# Patient Record
Sex: FEMALE | Race: WHITE | NOT HISPANIC OR LATINO
[De-identification: names, ages, dates, MRNs, and addresses within clinical notes are randomized per-mention and may not be internally consistent; named-entity substitution may affect disease eponyms.]

---

## 2022-01-03 PROBLEM — Z00.00 ENCOUNTER FOR PREVENTIVE HEALTH EXAMINATION: Status: ACTIVE | Noted: 2022-01-03

## 2022-01-20 ENCOUNTER — APPOINTMENT (OUTPATIENT)
Dept: GASTROENTEROLOGY | Facility: CLINIC | Age: 72
End: 2022-01-20

## 2022-03-10 ENCOUNTER — APPOINTMENT (OUTPATIENT)
Dept: GASTROENTEROLOGY | Facility: CLINIC | Age: 72
End: 2022-03-10
Payer: COMMERCIAL

## 2022-03-10 VITALS
WEIGHT: 134 LBS | SYSTOLIC BLOOD PRESSURE: 150 MMHG | DIASTOLIC BLOOD PRESSURE: 90 MMHG | HEIGHT: 62 IN | BODY MASS INDEX: 24.66 KG/M2 | OXYGEN SATURATION: 96 % | TEMPERATURE: 97.5 F | HEART RATE: 93 BPM

## 2022-03-10 DIAGNOSIS — K62.5 HEMORRHAGE OF ANUS AND RECTUM: ICD-10-CM

## 2022-03-10 DIAGNOSIS — Z80.0 ENCOUNTER FOR SCREENING FOR MALIGNANT NEOPLASM OF COLON: ICD-10-CM

## 2022-03-10 DIAGNOSIS — Z12.11 ENCOUNTER FOR SCREENING FOR MALIGNANT NEOPLASM OF COLON: ICD-10-CM

## 2022-03-10 DIAGNOSIS — Z86.010 PERSONAL HISTORY OF COLONIC POLYPS: ICD-10-CM

## 2022-03-10 DIAGNOSIS — R19.4 CHANGE IN BOWEL HABIT: ICD-10-CM

## 2022-03-10 DIAGNOSIS — Z87.891 PERSONAL HISTORY OF NICOTINE DEPENDENCE: ICD-10-CM

## 2022-03-10 PROCEDURE — 99213 OFFICE O/P EST LOW 20 MIN: CPT

## 2022-03-10 RX ORDER — GLUCOSAM/CHONDRO/HERB 149/HYAL 750-100 MG
TABLET ORAL
Refills: 0 | Status: ACTIVE | COMMUNITY

## 2022-03-10 RX ORDER — ALENDRONATE SODIUM 10 MG/1
10 TABLET ORAL
Refills: 0 | Status: ACTIVE | COMMUNITY

## 2022-03-10 RX ORDER — METHION/INOS/CHOL BT/B COM/LIV 110MG-86MG
CAPSULE ORAL
Refills: 0 | Status: ACTIVE | COMMUNITY

## 2022-03-10 RX ORDER — PSEUDOEPHEDRINE HCL 30 MG
TABLET ORAL
Refills: 0 | Status: ACTIVE | COMMUNITY

## 2022-03-10 RX ORDER — SODIUM PICOSULFATE, MAGNESIUM OXIDE, AND ANHYDROUS CITRIC ACID 10; 3.5; 12 MG/160ML; G/160ML; G/160ML
10-3.5-12 MG-GM LIQUID ORAL
Qty: 1 | Refills: 0 | Status: COMPLETED | COMMUNITY
Start: 2022-03-10 | End: 2022-03-12

## 2022-03-10 RX ORDER — DICLOFENAC SODIUM 75 MG/1
75 TABLET, DELAYED RELEASE ORAL
Refills: 0 | Status: ACTIVE | COMMUNITY

## 2022-03-10 NOTE — ASSESSMENT
[FreeTextEntry1] : Impression: History of polyps, family history of colon cancer.  Last colonoscopy 5 years ago.  Due for follow-up surveillance.  Local anal rectal bleeding from hemorrhoids.  Possible small rectocele or pelvic floor dysfunction.\par \par Plan: Continue fiber supplements daily.  Schedule colonoscopy with Clenpiq prep

## 2022-03-10 NOTE — HISTORY OF PRESENT ILLNESS
[Heartburn] : denies heartburn [Nausea] : denies nausea [Vomiting] : denies vomiting [Diarrhea] : denies diarrhea [Constipation] : stable constipation [Abdominal Pain] : denies abdominal pain [Yellow Skin Or Eyes (Jaundice)] : denies jaundice [Abdominal Swelling] : denies abdominal swelling [Rectal Pain] : denies rectal pain [GERD] : no gastroesophageal reflux disease [Hiatus Hernia] : no hiatus hernia [Peptic Ulcer Disease] : no peptic ulcer disease [Pancreatitis] : no pancreatitis [Cholelithiasis] : no cholelithiasis [Kidney Stone] : no kidney stone [Inflammatory Bowel Disease] : no inflammatory bowel disease [Irritable Bowel Syndrome] : no irritable bowel syndrome [Diverticulitis] : no diverticulitis [Alcohol Abuse] : no alcohol abuse [Malignancy] : no malignancy [Abdominal Surgery] : abdominal surgery [Appendectomy] : no appendectomy [Cholecystectomy] : no cholecystectomy [de-identified] : 72-year-old female with history of colon polyps and family history of colon cancer (sister) here to arrange follow-up surveillance colonoscopy.  Had polyps on her first 2 colonoscopies 3 years apart, last colonoscopy was 5 years ago no polyps found at that time.  Complains of occasional bright red blood per rectum with bowel movements, occasional difficulty initiating a bowel movement, and frequent seepage of stool after bowel movement.  The latter 2 significantly improved taking Metamucil.

## 2022-03-10 NOTE — REVIEW OF SYSTEMS
[As Noted in HPI] : as noted in HPI [Negative] : Heme/Lymph [FreeTextEntry9] : Osteoarthritis and shoulder replacement

## 2022-03-14 ENCOUNTER — NON-APPOINTMENT (OUTPATIENT)
Age: 72
End: 2022-03-14

## 2022-03-14 DIAGNOSIS — Z01.818 ENCOUNTER FOR OTHER PREPROCEDURAL EXAMINATION: ICD-10-CM

## 2022-03-14 DIAGNOSIS — Z20.822 CONTACT WITH AND (SUSPECTED) EXPOSURE TO COVID-19: ICD-10-CM

## 2022-03-15 LAB — SARS-COV-2 N GENE NPH QL NAA+PROBE: NOT DETECTED

## 2022-03-18 ENCOUNTER — APPOINTMENT (OUTPATIENT)
Dept: GASTROENTEROLOGY | Facility: AMBULATORY MEDICAL SERVICES | Age: 72
End: 2022-03-18
Payer: COMMERCIAL

## 2022-03-18 PROCEDURE — 45378 DIAGNOSTIC COLONOSCOPY: CPT | Mod: 33

## 2025-08-12 ENCOUNTER — APPOINTMENT (OUTPATIENT)
Dept: GASTROENTEROLOGY | Facility: CLINIC | Age: 75
End: 2025-08-12
Payer: MEDICARE

## 2025-08-12 VITALS — DIASTOLIC BLOOD PRESSURE: 70 MMHG | SYSTOLIC BLOOD PRESSURE: 110 MMHG

## 2025-08-12 VITALS
BODY MASS INDEX: 23.37 KG/M2 | HEART RATE: 111 BPM | OXYGEN SATURATION: 90 % | HEIGHT: 62 IN | WEIGHT: 127 LBS | TEMPERATURE: 96.8 F

## 2025-08-12 DIAGNOSIS — Z80.0 FAMILY HISTORY OF MALIGNANT NEOPLASM OF DIGESTIVE ORGANS: ICD-10-CM

## 2025-08-12 DIAGNOSIS — Z86.0101 PERSONAL HISTORY OF ADENOMATOUS AND SERRATED COLON POLYPS: ICD-10-CM

## 2025-08-12 DIAGNOSIS — R19.4 CHANGE IN BOWEL HABIT: ICD-10-CM

## 2025-08-12 PROCEDURE — 99204 OFFICE O/P NEW MOD 45 MIN: CPT
